# Patient Record
Sex: MALE | Race: WHITE | NOT HISPANIC OR LATINO | Employment: UNEMPLOYED
[De-identification: names, ages, dates, MRNs, and addresses within clinical notes are randomized per-mention and may not be internally consistent; named-entity substitution may affect disease eponyms.]

---

## 2021-12-21 ENCOUNTER — TRANSCRIBE ORDERS (OUTPATIENT)
Dept: OTHER | Age: 41
End: 2021-12-21

## 2021-12-21 ENCOUNTER — PRE VISIT (OUTPATIENT)
Dept: UROLOGY | Facility: CLINIC | Age: 41
End: 2021-12-21

## 2021-12-21 DIAGNOSIS — N46.01 ORGANIC AZOOSPERMIA: Primary | ICD-10-CM

## 2021-12-21 NOTE — TELEPHONE ENCOUNTER
Action 12/21/21 MMT   Action Taken  [facility]. Documents sent to scanning. Patient is scheduled with Dr. Smallwood in [place].

## 2022-02-23 ENCOUNTER — MYC MEDICAL ADVICE (OUTPATIENT)
Dept: UROLOGY | Facility: CLINIC | Age: 42
End: 2022-02-23

## 2022-02-23 ENCOUNTER — VIRTUAL VISIT (OUTPATIENT)
Dept: UROLOGY | Facility: CLINIC | Age: 42
End: 2022-02-23
Payer: COMMERCIAL

## 2022-02-23 DIAGNOSIS — Z90.79 ACQUIRED ABSENCE OF TESTIS: ICD-10-CM

## 2022-02-23 DIAGNOSIS — N46.01 AZOOSPERMIA: Primary | ICD-10-CM

## 2022-02-23 DIAGNOSIS — E29.1 TESTICULAR HYPOFUNCTION: ICD-10-CM

## 2022-02-23 PROBLEM — K57.92 DIVERTICULITIS: Status: ACTIVE | Noted: 2022-02-23

## 2022-02-23 PROBLEM — R55 VASOVAGAL SYNCOPE: Status: ACTIVE | Noted: 2022-02-23

## 2022-02-23 PROCEDURE — 99204 OFFICE O/P NEW MOD 45 MIN: CPT | Mod: GT | Performed by: UROLOGY

## 2022-02-23 RX ORDER — SERTRALINE HYDROCHLORIDE 100 MG/1
TABLET, FILM COATED ORAL
COMMUNITY

## 2022-02-23 RX ORDER — CLOMIPHENE CITRATE 50 MG/1
25 TABLET ORAL
COMMUNITY
Start: 2020-03-10

## 2022-02-23 RX ORDER — BUPROPION HYDROCHLORIDE 150 MG/1
TABLET ORAL
COMMUNITY

## 2022-02-23 RX ORDER — ATORVASTATIN CALCIUM 40 MG/1
TABLET, FILM COATED ORAL
COMMUNITY

## 2022-02-23 NOTE — PROGRESS NOTES
"  Massimo Garcia  who is being evaluated via a billable video visit.      How would you like to obtain your AVS? MyChart  If the video visit is dropped, the invitation should be resent by: Text to cell phone: [phone]  Will anyone else be joining your video visit? No    Video-Visit Details    Type of service:  Video Visit    Video Start Time: 11:30 AM    Video End Time:11:48 AM    Originating Location (pt. Location): Home    Distant Location (provider location):  [facility]     Platform used for Video Visit: Independent Space      It was my pleasure to see Mr. Massimo Garcia, a 41 year old male here in consultation today for fertility evaluation.  His finace is Kary Lai age nearly 40 ([birth year]).    This couple has been attempting to conceive for the last 1-2 yrs. They have no previous pregnancy together.  Pregnancies with other partners: she has a 9yo child from a previous partner.  They have tried timed intercourse.  He's never fathered a pregnancy. They have not tried IUI or IVF.    Female factors suspected: advanced age.        False: karyotype was normal.  Y-chromosome microdeletion was negative.      [facility]- [facility] with community care to discuss fertility.  He has been taking Clomid for low testosterone levels.  Last semen analysis he says was about 3 years ago, \"nonexistent\" sperm count.    Testis biopsy/ sperm extraction was recommended in the past.    He's been working now, interested in setting up the biopsy.      PAST MEDICAL HISTORY:    Genetic abnormality?    Depression    PAST SURG HISTORY  Hernia surgery x 2.  Solitary left testis .  Right had a \"mass\" but benign and was removed age 14.    Medications as of 2/23/2022:  Current Outpatient Medications   Medication Sig     atorvastatin (LIPITOR) 40 MG tablet      buPROPion (WELLBUTRIN XL) 150 MG 24 hr tablet      clomiPHENE (CLOMID) 50 MG tablet 25 mg     sertraline (ZOLOFT) 100 MG tablet      No current " facility-administered medications for this visit.        ALLERGY:     Allergies   Allergen Reactions     Other Environmental Allergy      Other reaction(s): tree pollen-sneezing and watery eyes       SOCIAL HISTORY:  Engaged to be . Occupation: -  currently laid off x 1 year.       No alcohol abuse, never used tobacco.   1 beer every few months.  Month recent marijuana    REVIEW OF SYSTEMS:  Significant for feeling well at present .    GENERAL PHYSICAL EXAM  Exam  General- Alert, oriented, nad.  Pleasant and conversant.  Eyes- anicteric, EOMI.  Resps- normal, non-labored.  No cough  Abdomen-  nondistended.   exam- deferred.   Neurological - no tremors  Skin - no discoloration/ lesions noted  Psychiatric - no anxiety, alert & oriented.      The rest of a comprehensive physical examination is deferred due to video visit restrictions.    Rectal exam deferred.     Labs/imaging reviewed by me today:    I was able to get [facility] the [facility]:  FSH baseline 8->24 on Clomid  Labs fertility labs were 12/20 testosterone 453, FSH 24.  No recent semen analysis.    Normal karyotype and YCMD 2019.    ASSESSMENT:    Fertility Testing.    Testicular hypofunction:  Azoospermia    Solitary left testis.    PLAN:          I was able to find his past labs through the [facility] computer system.  His next step is a TESE/ diagnostic testis biopsy.  Genetics tests were normal.    It would be reasonable to get a repeat semen analysis prior to that surgery.     We discussed that female age is a factor, and a IVF clinic evaluation for Kary would be important to make sure that surgical sperm acquisition is a viable and correct next step for him. (I would hate to do a sperm retrieval only to find that she is not an IVF candidate.)    Continue Clomid, 1/2 tab daily x 3 weeks, off a week, then back on 3 weeks.    Order placed for semen analysis  Tentative surgery orders placed for diagnostic testis biopsy/ sperm  extraction and infectious disease labs. (Hep B ag, Hep C Ab, HIV, treponema).      Thank-you for allowing me to care for your patient.  Sincerely,    Carlos Eduardo Smallwood MD      CC: [facility]      Additional Coding Information:    Problems:  3 -- one stable chronic illness    Data Reviewed  Review of the result(s) of each unique test - 3 tests reviewed.    Tests ordered/pendin labs ordered.    Notes from other providers reviewed: Dr. Melendez [facility] urology 2019.  I spoke with Dr. Davis on the phone at [facility] urology to get patient's past urology info/ results.    Diagnosis or treatment significantly limited by social determinants of health - unemployed/ uninsured at this time.    Level of risk:  4 -- prescription drug management    Time spent:  41 minutes spent on the date of the encounter doing chart review, history and exam, documentation and further activities per the note.  Video visit time included.

## 2022-03-08 DIAGNOSIS — Z11.59 ENCOUNTER FOR SCREENING FOR OTHER VIRAL DISEASES: Primary | ICD-10-CM

## 2022-03-08 PROBLEM — E29.1 TESTICULAR HYPOFUNCTION: Status: ACTIVE | Noted: 2022-03-08

## 2022-03-08 PROBLEM — N46.01 AZOOSPERMIA: Status: ACTIVE | Noted: 2022-03-08

## 2022-03-08 PROBLEM — Z90.79: Status: ACTIVE | Noted: 2022-03-08

## 2022-03-09 ENCOUNTER — TELEPHONE (OUTPATIENT)
Dept: UROLOGY | Facility: CLINIC | Age: 42
End: 2022-03-09

## 2022-03-09 NOTE — TELEPHONE ENCOUNTER
Called patient to schedule procedure with Dr. Smallwood for Tuesday 4/12/22. Patient has questions about scheduling labs and what exactly he needed to before the procedure. Patient also wondering if it would be better off scheduling appointments through [facility] versus closer to home in [place]. Gave patient RN phone number to further explain what he needs to do.     Earlene Anderson  Radha-Op Coordinator for Urology Surgery    [phone]

## 2022-03-10 ENCOUNTER — PATIENT OUTREACH (OUTPATIENT)
Dept: UROLOGY | Facility: CLINIC | Age: 42
End: 2022-03-10

## 2022-03-10 DIAGNOSIS — N46.01 AZOOSPERMIA: Primary | ICD-10-CM

## 2022-03-10 NOTE — TELEPHONE ENCOUNTER
Patient notified what needs to be done before his procedure. He needs to have a semen analysis and hormone levels done prior. He will have done at the [facility]. Orders faxed     Trudy Bulter RN, BSN  Care Coordinator Urology

## 2022-03-18 ENCOUNTER — TRANSFERRED RECORDS (OUTPATIENT)
Dept: HEALTH INFORMATION MANAGEMENT | Facility: CLINIC | Age: 42
End: 2022-03-18

## 2022-03-24 ENCOUNTER — MYC MEDICAL ADVICE (OUTPATIENT)
Dept: UROLOGY | Facility: CLINIC | Age: 42
End: 2022-03-24

## 2022-04-03 ENCOUNTER — HEALTH MAINTENANCE LETTER (OUTPATIENT)
Age: 42
End: 2022-04-03

## 2022-04-04 NOTE — TELEPHONE ENCOUNTER
Beti Escamilla 2 hours ago (12:17 PM)     MT    We haven't received anything for him from [place]

## 2022-04-06 ENCOUNTER — TELEPHONE (OUTPATIENT)
Dept: UROLOGY | Facility: CLINIC | Age: 42
End: 2022-04-06

## 2022-04-06 NOTE — TELEPHONE ENCOUNTER
Call center called with Sussy from a lab. Sussy is returning Trudy's call in regards to having labs faxed over. Sussy is not sure as to what these labs are but stated that the pt said these labs are pre-op labs and bloodwork. Writer listed all current labs in pt's chart and all have been completed (FSH, testosterone, hep B and C, syphilis, HIV, SA, treponema) but the COVID test and pt is scheduled for a COVID test on 4/9/2022.

## 2022-04-07 ENCOUNTER — TRANSFERRED RECORDS (OUTPATIENT)
Dept: MULTI SPECIALTY CLINIC | Facility: CLINIC | Age: 42
End: 2022-04-07

## 2022-04-07 ENCOUNTER — TELEPHONE (OUTPATIENT)
Dept: UROLOGY | Facility: CLINIC | Age: 42
End: 2022-04-07

## 2022-04-07 LAB
ALT SERPL-CCNC: 27 U/L
AST SERPL-CCNC: 19 U/L
CREATININE (EXTERNAL): 0.9 MG/DL (ref 0.7–1.2)
GFR ESTIMATED (EXTERNAL): >90 ML/MIN/1.73 M2
GLUCOSE (EXTERNAL): 114 MG/DL (ref 74–100)
POTASSIUM (EXTERNAL): 4.3 MMOL/L (ref 3.5–5.1)

## 2022-04-07 NOTE — TELEPHONE ENCOUNTER
[facility].   His ID labs are done and in the system    Trudy Butler RN, BSN  Care Coordinator Urology

## 2022-04-11 ENCOUNTER — ANESTHESIA EVENT (OUTPATIENT)
Dept: SURGERY | Facility: AMBULATORY SURGERY CENTER | Age: 42
End: 2022-04-11
Payer: COMMERCIAL

## 2022-04-11 DIAGNOSIS — N46.01 AZOOSPERMIA: Primary | ICD-10-CM

## 2022-04-12 ENCOUNTER — ANESTHESIA (OUTPATIENT)
Dept: SURGERY | Facility: AMBULATORY SURGERY CENTER | Age: 42
End: 2022-04-12
Payer: COMMERCIAL

## 2022-04-12 ENCOUNTER — LAB (OUTPATIENT)
Dept: LAB | Facility: CLINIC | Age: 42
End: 2022-04-12
Attending: UROLOGY
Payer: COMMERCIAL

## 2022-04-12 ENCOUNTER — HOSPITAL ENCOUNTER (OUTPATIENT)
Facility: AMBULATORY SURGERY CENTER | Age: 42
Discharge: HOME | End: 2022-04-12
Attending: UROLOGY
Payer: COMMERCIAL

## 2022-04-12 VITALS
SYSTOLIC BLOOD PRESSURE: 108 MMHG | BODY MASS INDEX: 30.92 KG/M2 | HEART RATE: 63 BPM | HEIGHT: 68 IN | RESPIRATION RATE: 16 BRPM | WEIGHT: 204 LBS | DIASTOLIC BLOOD PRESSURE: 59 MMHG | TEMPERATURE: 97 F | OXYGEN SATURATION: 96 %

## 2022-04-12 DIAGNOSIS — N46.01 AZOOSPERMIA: ICD-10-CM

## 2022-04-12 DIAGNOSIS — Z90.79 ACQUIRED ABSENCE OF TESTIS: ICD-10-CM

## 2022-04-12 DIAGNOSIS — E29.1 TESTICULAR HYPOFUNCTION: ICD-10-CM

## 2022-04-12 LAB
ANALYSIS TEMP - CENTIGRADE: 23 CENTIGRADE
COLLECTION METHOD: NORMAL
COLLECTION SITE: NORMAL
DAL- RECEIVED TIME: NORMAL
TESE NARRATIVE: NORMAL
TIME OF ANALYSIS: NORMAL

## 2022-04-12 PROCEDURE — 89264 IDENTIFY SPERM TISSUE: CPT

## 2022-04-12 PROCEDURE — 54505 BIOPSY OF TESTIS: CPT

## 2022-04-12 PROCEDURE — 54505 BIOPSY OF TESTIS: CPT | Mod: GC | Performed by: UROLOGY

## 2022-04-12 PROCEDURE — 88307 TISSUE EXAM BY PATHOLOGIST: CPT | Mod: 26 | Performed by: PATHOLOGY

## 2022-04-12 PROCEDURE — 88307 TISSUE EXAM BY PATHOLOGIST: CPT | Mod: TC | Performed by: UROLOGY

## 2022-04-12 RX ORDER — SODIUM CHLORIDE, SODIUM LACTATE, POTASSIUM CHLORIDE, CALCIUM CHLORIDE 600; 310; 30; 20 MG/100ML; MG/100ML; MG/100ML; MG/100ML
INJECTION, SOLUTION INTRAVENOUS CONTINUOUS
Status: DISCONTINUED | OUTPATIENT
Start: 2022-04-12 | End: 2022-04-13 | Stop reason: HOSPADM

## 2022-04-12 RX ORDER — ONDANSETRON 4 MG/1
4 TABLET, ORALLY DISINTEGRATING ORAL EVERY 30 MIN PRN
Status: DISCONTINUED | OUTPATIENT
Start: 2022-04-12 | End: 2022-04-13 | Stop reason: HOSPADM

## 2022-04-12 RX ORDER — MEPERIDINE HYDROCHLORIDE 25 MG/ML
12.5 INJECTION INTRAMUSCULAR; INTRAVENOUS; SUBCUTANEOUS
Status: DISCONTINUED | OUTPATIENT
Start: 2022-04-12 | End: 2022-04-13 | Stop reason: HOSPADM

## 2022-04-12 RX ORDER — ONDANSETRON 2 MG/ML
4 INJECTION INTRAMUSCULAR; INTRAVENOUS EVERY 30 MIN PRN
Status: DISCONTINUED | OUTPATIENT
Start: 2022-04-12 | End: 2022-04-13 | Stop reason: HOSPADM

## 2022-04-12 RX ORDER — LIDOCAINE 40 MG/G
CREAM TOPICAL
Status: DISCONTINUED | OUTPATIENT
Start: 2022-04-12 | End: 2022-04-12 | Stop reason: HOSPADM

## 2022-04-12 RX ORDER — GLYCOPYRROLATE 0.2 MG/ML
INJECTION, SOLUTION INTRAMUSCULAR; INTRAVENOUS PRN
Status: DISCONTINUED | OUTPATIENT
Start: 2022-04-12 | End: 2022-04-12

## 2022-04-12 RX ORDER — LABETALOL HYDROCHLORIDE 5 MG/ML
10 INJECTION, SOLUTION INTRAVENOUS
Status: DISCONTINUED | OUTPATIENT
Start: 2022-04-12 | End: 2022-04-12 | Stop reason: HOSPADM

## 2022-04-12 RX ORDER — CEFAZOLIN SODIUM 2 G/50ML
2 SOLUTION INTRAVENOUS
Status: COMPLETED | OUTPATIENT
Start: 2022-04-12 | End: 2022-04-12

## 2022-04-12 RX ORDER — CEFAZOLIN SODIUM 2 G/50ML
2 SOLUTION INTRAVENOUS SEE ADMIN INSTRUCTIONS
Status: DISCONTINUED | OUTPATIENT
Start: 2022-04-12 | End: 2022-04-12 | Stop reason: HOSPADM

## 2022-04-12 RX ORDER — SODIUM CHLORIDE, SODIUM LACTATE, POTASSIUM CHLORIDE, CALCIUM CHLORIDE 600; 310; 30; 20 MG/100ML; MG/100ML; MG/100ML; MG/100ML
INJECTION, SOLUTION INTRAVENOUS CONTINUOUS
Status: DISCONTINUED | OUTPATIENT
Start: 2022-04-12 | End: 2022-04-12 | Stop reason: HOSPADM

## 2022-04-12 RX ORDER — BUPIVACAINE HYDROCHLORIDE 5 MG/ML
INJECTION, SOLUTION PERINEURAL PRN
Status: DISCONTINUED | OUTPATIENT
Start: 2022-04-12 | End: 2022-04-12 | Stop reason: HOSPADM

## 2022-04-12 RX ORDER — PROPOFOL 10 MG/ML
INJECTION, EMULSION INTRAVENOUS CONTINUOUS PRN
Status: DISCONTINUED | OUTPATIENT
Start: 2022-04-12 | End: 2022-04-12

## 2022-04-12 RX ORDER — FENTANYL CITRATE 50 UG/ML
25 INJECTION, SOLUTION INTRAMUSCULAR; INTRAVENOUS EVERY 5 MIN PRN
Status: DISCONTINUED | OUTPATIENT
Start: 2022-04-12 | End: 2022-04-12 | Stop reason: HOSPADM

## 2022-04-12 RX ORDER — ACETAMINOPHEN 325 MG/1
975 TABLET ORAL ONCE
Status: COMPLETED | OUTPATIENT
Start: 2022-04-12 | End: 2022-04-12

## 2022-04-12 RX ORDER — LIDOCAINE HYDROCHLORIDE 10 MG/ML
INJECTION, SOLUTION INFILTRATION; PERINEURAL PRN
Status: DISCONTINUED | OUTPATIENT
Start: 2022-04-12 | End: 2022-04-12 | Stop reason: HOSPADM

## 2022-04-12 RX ORDER — LIDOCAINE HYDROCHLORIDE 20 MG/ML
INJECTION, SOLUTION INFILTRATION; PERINEURAL PRN
Status: DISCONTINUED | OUTPATIENT
Start: 2022-04-12 | End: 2022-04-12

## 2022-04-12 RX ORDER — OXYCODONE HYDROCHLORIDE 5 MG/1
5 TABLET ORAL EVERY 6 HOURS PRN
Qty: 8 TABLET | Refills: 0 | Status: SHIPPED | OUTPATIENT
Start: 2022-04-12

## 2022-04-12 RX ORDER — PROPOFOL 10 MG/ML
INJECTION, EMULSION INTRAVENOUS PRN
Status: DISCONTINUED | OUTPATIENT
Start: 2022-04-12 | End: 2022-04-12

## 2022-04-12 RX ORDER — ONDANSETRON 2 MG/ML
INJECTION INTRAMUSCULAR; INTRAVENOUS PRN
Status: DISCONTINUED | OUTPATIENT
Start: 2022-04-12 | End: 2022-04-12

## 2022-04-12 RX ORDER — FENTANYL CITRATE 50 UG/ML
25 INJECTION, SOLUTION INTRAMUSCULAR; INTRAVENOUS
Status: DISCONTINUED | OUTPATIENT
Start: 2022-04-12 | End: 2022-04-13 | Stop reason: HOSPADM

## 2022-04-12 RX ORDER — KETAMINE HYDROCHLORIDE 10 MG/ML
INJECTION INTRAMUSCULAR; INTRAVENOUS PRN
Status: DISCONTINUED | OUTPATIENT
Start: 2022-04-12 | End: 2022-04-12

## 2022-04-12 RX ADMIN — LIDOCAINE HYDROCHLORIDE 60 MG: 20 INJECTION, SOLUTION INFILTRATION; PERINEURAL at 08:01

## 2022-04-12 RX ADMIN — PROPOFOL 50 MG: 10 INJECTION, EMULSION INTRAVENOUS at 08:02

## 2022-04-12 RX ADMIN — ONDANSETRON 4 MG: 2 INJECTION INTRAMUSCULAR; INTRAVENOUS at 07:58

## 2022-04-12 RX ADMIN — KETAMINE HYDROCHLORIDE 10 MG: 10 INJECTION INTRAMUSCULAR; INTRAVENOUS at 08:13

## 2022-04-12 RX ADMIN — CEFAZOLIN SODIUM 2 G: 2 SOLUTION INTRAVENOUS at 07:53

## 2022-04-12 RX ADMIN — ACETAMINOPHEN 975 MG: 325 TABLET ORAL at 07:21

## 2022-04-12 RX ADMIN — PROPOFOL 50 MCG/KG/MIN: 10 INJECTION, EMULSION INTRAVENOUS at 08:38

## 2022-04-12 RX ADMIN — PROPOFOL 200 MCG/KG/MIN: 10 INJECTION, EMULSION INTRAVENOUS at 08:01

## 2022-04-12 RX ADMIN — SODIUM CHLORIDE, SODIUM LACTATE, POTASSIUM CHLORIDE, CALCIUM CHLORIDE: 600; 310; 30; 20 INJECTION, SOLUTION INTRAVENOUS at 07:21

## 2022-04-12 RX ADMIN — KETAMINE HYDROCHLORIDE 10 MG: 10 INJECTION INTRAMUSCULAR; INTRAVENOUS at 08:12

## 2022-04-12 RX ADMIN — GLYCOPYRROLATE 0.2 MG: 0.2 INJECTION, SOLUTION INTRAMUSCULAR; INTRAVENOUS at 07:58

## 2022-04-12 NOTE — RESULT ENCOUNTER NOTE
Dear Massimo,     Here are your recent results.   Testis biopsy results are very good, sperm were readily seen in average amount for a surgical sperm acquisition.  These should be very useable for IVF.    Please let us know if you have any questions or concerns.     Kandice ARIAS

## 2022-04-12 NOTE — INTERVAL H&P NOTE
"I have reviewed the surgical (or preoperative) H&P that is linked to this encounter, and examined the patient. There are no significant changes    Clinical Conditions Present on Arrival:  Clinically Significant Risk Factors Present on Admission                   # Obesity: Estimated body mass index is 31.02 kg/m  as calculated from the following:    Height as of this encounter: 1.727 m (5' 8\").    Weight as of this encounter: 92.5 kg (204 lb).       "

## 2022-04-12 NOTE — BRIEF OP NOTE
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]  Testicular hypofunction [E29.1]  Post-operative diagnosis Same as pre-operative diagnosis    Procedure: Procedure(s):  Diagnostic left testicle biopsy  Left testicle sperm extraction for cryopreservation at  [facility]  Surgeon: Surgeon(s) and Role:     * Carlos Eduardo Smallwood MD - Primary  Anesthesia: Monitor Anesthesia Care   Estimated Blood Loss: 1 mL from 4/12/2022  7:56 AM to 4/12/2022  8:55 AM      Drains: None  Specimens:   ID Type Source Tests Collected by Time Destination   1 : Left Testicle Tissue Tissue Testis, Left SURGICAL PATHOLOGY EXAM Carlos Eduardo Smallwood MD 4/12/2022  8:38 AM      Findings:   abundant sperm .  Complications: None.  Implants: * No implants in log *

## 2022-04-12 NOTE — ANESTHESIA POSTPROCEDURE EVALUATION
Patient: Massimo Garcia    Procedure: Procedure(s):  Diagnostic left testicle biopsy  Left testicle sperm extraction for cryopreservation at  [facility]       Anesthesia Type:  MAC    Note:  Disposition: Outpatient   Postop Pain Control: Uneventful            Sign Out: Well controlled pain   PONV: No   Neuro/Psych: Uneventful            Sign Out: Acceptable/Baseline neuro status   Airway/Respiratory: Uneventful            Sign Out: Acceptable/Baseline resp. status   CV/Hemodynamics: Uneventful            Sign Out: Acceptable CV status   Other NRE: NONE   DID A NON-ROUTINE EVENT OCCUR? No           Last vitals:  Vitals Value Taken Time   /59 04/12/22 0908   Temp 36.1  C (97  F) 04/12/22 0908   Pulse 63 04/12/22 0908   Resp 16 04/12/22 0908   SpO2 96 % 04/12/22 0908       Electronically Signed By: Alvaro Elizabeth MD  April 12, 2022  2:12 PM

## 2022-04-12 NOTE — ANESTHESIA PREPROCEDURE EVALUATION
Anesthesia Pre-Procedure Evaluation    Patient: Massimo Garcia   MRN: [id number] : 1980        Procedure : Procedure(s):  Diagnostic left testicle biopsy  Left testicle sperm extraction for cryopreservation at  [facility]          History reviewed. No pertinent past medical history.   History reviewed. No pertinent surgical history.   Allergies   Allergen Reactions     Other Environmental Allergy      Other reaction(s): tree pollen-sneezing and watery eyes      Social History     Tobacco Use     Smoking status: Not on file     Smokeless tobacco: Not on file   Substance Use Topics     Alcohol use: Not on file      Wt Readings from Last 1 Encounters:   22 92.5 kg (204 lb)        Anesthesia Evaluation            ROS/MED HX  ENT/Pulmonary:  - neg pulmonary ROS     Neurologic:  - neg neurologic ROS     Cardiovascular:  - neg cardiovascular ROS     METS/Exercise Tolerance:     Hematologic:  - neg hematologic  ROS     Musculoskeletal:  - neg musculoskeletal ROS     GI/Hepatic:  - neg GI/hepatic ROS     Renal/Genitourinary:  - neg Renal ROS     Endo:  - neg endo ROS     Psychiatric/Substance Use: Comment: PTSD - neg psychiatric ROS     Infectious Disease:  - neg infectious disease ROS     Malignancy:  - neg malignancy ROS     Other:  - neg other ROS          Physical Exam    Airway  airway exam normal      Mallampati: II   TM distance: > 3 FB   Neck ROM: full   Mouth opening: > 3 cm    Respiratory Devices and Support         Dental  no notable dental history         Cardiovascular          Rhythm and rate: regular and normal     Pulmonary   pulmonary exam normal        breath sounds clear to auscultation           OUTSIDE LABS:  CBC: No results found for: WBC, HGB, HCT, PLT  BMP: No results found for: NA, POTASSIUM, CHLORIDE, CO2, BUN, CR, GLC  COAGS: No results found for: PTT, INR, FIBR  POC: No results found for: BGM, HCG, HCGS  HEPATIC: No results found for: ALBUMIN, PROTTOTAL,  ALT, AST, GGT, ALKPHOS, BILITOTAL, BILIDIRECT, ZHAO  OTHER: No results found for: PH, LACT, A1C, WANG, PHOS, MAG, LIPASE, AMYLASE, TSH, T4, T3, CRP, SED    Anesthesia Plan    ASA Status:  1   NPO Status:  NPO Appropriate    Anesthesia Type: MAC.     - Reason for MAC: immobility needed, straight local not clinically adequate   Induction: Intravenous.   Maintenance: TIVA.        Consents    Anesthesia Plan(s) and associated risks, benefits, and realistic alternatives discussed. Questions answered and patient/representative(s) expressed understanding.    - Discussed:     - Discussed with:  Patient      - Extended Intubation/Ventilatory Support Discussed: No.      - Patient is DNR/DNI Status: No    Use of blood products discussed: No .     Postoperative Care    Pain management: IV analgesics, Oral pain medications, Multi-modal analgesia.   PONV prophylaxis: Ondansetron (or other 5HT-3), Background Propofol Infusion     Comments:                Alvaro Elizabeth MD

## 2022-04-12 NOTE — OP NOTE
PREOPERATIVE DIAGNOSIS:  Azoospermia  POSTOPERATIVE DIAGNOSIS: Same   PROCEDURE:   1. Left testicular sperm extraction  2. Left testis biopsy/diagnostic.    ANESTHESIA: MAC and local  STAFF SURGEON: Carlos Eduardo Smallwood MD present and scrubbed for entire procedure  RESIDENT SURGEON: Sabas Davila MD  ESTIMATED BLOOD LOSS: 1 mL   COMPLICATIONS: None.   SPECIMENS:   ID Type Source Tests Collected by Time Destination   1 : Left Testicle Tissue Tissue Testis, Left SURGICAL PATHOLOGY EXAM Carlos Eduardo Smallwood MD 4/12/2022  8:38 AM    Left testis tissue to andrology lab also.    FINDINGS:   Readily identifiable sperm in left testicle touch prep.  INDICATIONS: Massimo Garcia is a 41 year old male with suspected non-obstructive azoospermia and elevated FSH, with a PMH of right orchiectomy for a benign mass. After discussion of risks, benefits, and alternatives he elected to proceed with testicular biopsy .  DESCRIPTION OF THE PROCEDURE: After obtaining informed consent, the patient was brought to the operating room and placed in the supine position on the operating room table. All pressure points were adequately cushioned and pneumatic compression devices were applied to the patient's bilateral lower extremities. Appropriate preoperative antibiotics were administered. MAC was induced without difficulty. The patient was then prepped and draped in the usual sterile fashion. A timeout was performed to confirm the correct patient, positioning, procedure, and laterality.   The left testicle was identified and the spermatic cord was first blocked with 10cc of A 1:1 mixture of 1% lidocaine and 0.5% Marcaine. An additional 2cc of local was applied to the superficial skin of the scrotum overlying the intended site of biopsy (right testicle). A 15 blade scalpel was used to incise the skin iris scissors used to dissect down through the tunica vaginalis. A 5mm incision was then made into the tunica albuginea and a small bit of seminiferous  tubules were taken and placed in formalin, and additional samples of tubules were placed into Pepe's media.  Left testis tissue was viewed under the microscope intraoperatively and sperm were found to be readily present.   Electrocautery was then used to achieve hemostasis. 4-0 monocryl suture was then used to close the tunica albuginea in running fashion. Tunica vaginalis, dartos, and and skin were then closed in an interrupted fashion in separate layers using 3-0 chromic suture. Topical bacitracin, scrotal support and fluffs were applied.   All sponge and instrument counts were correct x2 at the end of the procedure.  The patient was awoken from sedation and transferred to the PACU in stable condition  PLAN:  - D/C home today  - F/U with Dr. Cl LEZAMA   - We will contact him with biopsy results.    I was present and scrubbed for the entire procedure.  Carlos Eduardo Smallwood MD  Urology Staff

## 2022-04-12 NOTE — ANESTHESIA CARE TRANSFER NOTE
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    Anesthesia Type:   MAC     Note:    Oropharynx: oropharynx clear of all foreign objects and spontaneously breathing  Level of Consciousness: drowsy  Oxygen Supplementation: room air    Independent Airway: airway patency satisfactory and stable  Dentition: dentition unchanged  Vital Signs Stable: post-procedure vital signs reviewed and stable  Report to RN Given: handoff report given  Patient transferred to: Phase II    Handoff Report: Identifed the Patient, Identified the Reponsible Provider, Reviewed the pertinent medical history, Discussed the surgical course, Reviewed Intra-OP anesthesia mangement and issues during anesthesia, Set expectations for post-procedure period and Allowed opportunity for questions and acknowledgement of understanding      Vitals:  Vitals Value Taken Time   BP     Temp     Pulse     Resp     SpO2         Electronically Signed By: ANGELIKA Bell CRNA  April 12, 2022  8:54 AM

## 2022-04-12 NOTE — DISCHARGE INSTRUCTIONS
[facility]  Home Care Following Anesthesia  For 24 hours after surgery:  Get plenty of rest.  A responsible adult must stay with you for at least 24 hours after you leave the surgery center.  Do not drive or use heavy equipment.  If you have weakness or tingling, don't drive or use heavy equipment until this feeling goes away.   Do not drink alcohol.   Avoid strenuous or risky activities.  Ask for help when climbing stairs.  You may feel lightheaded.  IF so, sit for a few minutes before standing.  Have someone help you get up.   If you have nausea (feel sick to your stomach): Drink only clear liquids such as apple juice, ginger ale, broth or 7-Up.  Rest may also help.  Be sure to drink enough fluids.  Move to a regular diet as you feel able.   You may have a slight fever.  Call the doctor if your fever is over 100 F (37.7 C) (taken under the tongue) or lasts longer than 24 hours.  You may have a dry mouth, a sore throat, muscle aches or trouble sleeping. These should go away after 24 hours.  Do not make important or legal decisions.   It is recommended to avoid smoking.               Tips for taking pain medications  To get the best pain relief possible, remember these points:  Take pain medications as directed, before pain becomes severe.  Pain medication can upset your stomach: taking it with food may help.  Constipation is a common side effect of pain medication. Drink plenty of  fluids.  Eat foods high in fiber. Take a stool softener if recommended by your doctor or pharmacist.  Do not drink alcohol, drive or operate machinery while taking pain medications.  Ask about other ways to control pain, such as with heat, ice or relaxation.    Tylenol/Acetaminophen Consumption  To help encourage the safe use of acetaminophen, the makers of TYLENOL  have lowered the maximum daily dose for single-ingredient Extra Strength TYLENOL  (acetaminophen) products sold in the [place] from 8 pills  per day (4,000 mg) to 6 pills per day (3,000 mg). The dosing interval has also changed from 2 pills every 4-6 hours to 2 pills every 6 hours.  If you feel your pain relief is insufficient, you may take Tylenol/Acetaminophen in addition to your narcotic pain medication.   Be careful not to exceed 3,000 mg of Tylenol/Acetaminophen in a 24 hour period from all sources.  If you are taking extra strength Tylenol/acetaminophen (500 mg), the maximum dose is 6 tablets in 24 hours.  If you are taking regular strength acetaminophen (325 mg), the maximum dose is 9 tablets in 24 hours.    Call a doctor for any of the following:  Signs of infection (fever, growing tenderness at the surgery site, a large amount of drainage or bleeding, severe pain, foul-smelling drainage, redness, swelling).  It has been over 8 to 10 hours since surgery and you are still not able to urinate (pass water).  Headache for over 24 hours.  Numbness, tingling or weakness the day after surgery (if you had spinal anesthesia).  Signs of Covid-19 infection (temperature over 100 degrees, shortness of breath, cough, loss of taste/smell, generalized body aches, persistent headache, chills, sore throat, nausea/vomiting/diarrhea)  Your doctor is:  Dr. Carlos Eduardo Smallwood, Prostate and Urology: [phone]                    Or dial [phone] and ask for the resident on call for:  Prostate Urology  For emergency care, call the:  [facility]:  [phone] (TTY for hearing impaired: [phone])

## 2022-04-13 LAB
PATH REPORT.COMMENTS IMP SPEC: NORMAL
PATH REPORT.FINAL DX SPEC: NORMAL
PATH REPORT.GROSS SPEC: NORMAL
PATH REPORT.MICROSCOPIC SPEC OTHER STN: NORMAL
PATH REPORT.RELEVANT HX SPEC: NORMAL
PHOTO IMAGE: NORMAL

## 2022-10-03 ENCOUNTER — HEALTH MAINTENANCE LETTER (OUTPATIENT)
Age: 42
End: 2022-10-03

## 2023-05-20 ENCOUNTER — HEALTH MAINTENANCE LETTER (OUTPATIENT)
Age: 43
End: 2023-05-20

## 2024-07-27 ENCOUNTER — HEALTH MAINTENANCE LETTER (OUTPATIENT)
Age: 44
End: 2024-07-27

## (undated) DEVICE — STRAP KNEE/BODY 31143004

## (undated) DEVICE — SU MONOCRYL 4-0 RB-1 27" Y214H

## (undated) DEVICE — PREP POVIDONE-IODINE 7.5% SCRUB 4OZ BOTTLE MDS093945

## (undated) DEVICE — DRAPE LAP W/ARMBOARD 29410

## (undated) DEVICE — PREP POVIDONE IODINE SOLUTION 10% 4OZ BOTTLE 29906-004

## (undated) DEVICE — SOL WATER IRRIG 500ML BOTTLE 2F7113

## (undated) DEVICE — BLADE CLIPPER SGL USE 9680

## (undated) DEVICE — GLOVE PROTEXIS W/NEU-THERA 8.5  2D73TE85

## (undated) DEVICE — SUCTION MANIFOLD NEPTUNE 2 SYS 1 PORT 702-025-000

## (undated) DEVICE — PACK MINOR CUSTOM ASC

## (undated) DEVICE — SU CHROMIC 3-0 SH 27" G122H

## (undated) DEVICE — BLADE KNIFE SURG 11 371111

## (undated) DEVICE — ESU GROUND PAD ADULT W/CORD E7507

## (undated) DEVICE — GLOVE PROTEXIS W/NEU-THERA 7.5  2D73TE75

## (undated) DEVICE — SYR 10ML FINGER CONTROL W/O NDL 309695

## (undated) DEVICE — NDL ANGIOCATH 14GA 1.25" 4048

## (undated) DEVICE — LINEN TOWEL PACK X5 5464

## (undated) DEVICE — SU VICRYL 4-0 RB-1 27" J304

## (undated) DEVICE — DRSG KERLIX FLUFFS X5

## (undated) DEVICE — PAD CHUX UNDERPAD 30X30"

## (undated) DEVICE — ESU ELEC NDL 1" COATED/INSULATED E1465

## (undated) DEVICE — SOL NACL 0.9% IRRIG 500ML BOTTLE 2F7123

## (undated) DEVICE — PREP SKIN SCRUB TRAY 4461A

## (undated) RX ORDER — CEFAZOLIN SODIUM 1 G/3ML
INJECTION, POWDER, FOR SOLUTION INTRAMUSCULAR; INTRAVENOUS
Status: DISPENSED
Start: 2022-04-12

## (undated) RX ORDER — LIDOCAINE HYDROCHLORIDE 10 MG/ML
INJECTION, SOLUTION EPIDURAL; INFILTRATION; INTRACAUDAL; PERINEURAL
Status: DISPENSED
Start: 2022-04-12

## (undated) RX ORDER — PROPOFOL 10 MG/ML
INJECTION, EMULSION INTRAVENOUS
Status: DISPENSED
Start: 2022-04-12

## (undated) RX ORDER — ONDANSETRON 2 MG/ML
INJECTION INTRAMUSCULAR; INTRAVENOUS
Status: DISPENSED
Start: 2022-04-12

## (undated) RX ORDER — BUPIVACAINE HYDROCHLORIDE 5 MG/ML
INJECTION, SOLUTION EPIDURAL; INTRACAUDAL
Status: DISPENSED
Start: 2022-04-12

## (undated) RX ORDER — GLYCOPYRROLATE 0.2 MG/ML
INJECTION INTRAMUSCULAR; INTRAVENOUS
Status: DISPENSED
Start: 2022-04-12

## (undated) RX ORDER — LIDOCAINE HYDROCHLORIDE 20 MG/ML
INJECTION, SOLUTION EPIDURAL; INFILTRATION; INTRACAUDAL; PERINEURAL
Status: DISPENSED
Start: 2022-04-12

## (undated) RX ORDER — KETOROLAC TROMETHAMINE 30 MG/ML
INJECTION, SOLUTION INTRAMUSCULAR; INTRAVENOUS
Status: DISPENSED
Start: 2022-04-12

## (undated) RX ORDER — ACETAMINOPHEN 325 MG/1
TABLET ORAL
Status: DISPENSED
Start: 2022-04-12